# Patient Record
Sex: MALE | Race: WHITE | NOT HISPANIC OR LATINO | Employment: FULL TIME | URBAN - METROPOLITAN AREA
[De-identification: names, ages, dates, MRNs, and addresses within clinical notes are randomized per-mention and may not be internally consistent; named-entity substitution may affect disease eponyms.]

---

## 2020-03-19 ENCOUNTER — OCCUPATIONAL HEALTH (OUTPATIENT)
Dept: URGENT CARE | Facility: CLINIC | Age: 30
End: 2020-03-19

## 2020-03-19 DIAGNOSIS — Z02.89 ENCOUNTER FOR PHYSICAL EXAMINATION RELATED TO EMPLOYMENT: ICD-10-CM

## 2020-03-19 PROCEDURE — 94010 BREATHING CAPACITY TEST: CPT | Mod: S$GLB,,, | Performed by: EMERGENCY MEDICINE

## 2020-03-19 PROCEDURE — 94010 BREATHING CAPACITY TEST: ICD-10-PCS | Mod: S$GLB,,, | Performed by: EMERGENCY MEDICINE

## 2020-03-19 PROCEDURE — 99499 PHYSICAL - BASIC COMPLEXITY: ICD-10-PCS | Mod: S$GLB,,, | Performed by: EMERGENCY MEDICINE

## 2020-03-19 PROCEDURE — 36415 PR COLLECTION VENOUS BLOOD,VENIPUNCTURE: ICD-10-PCS | Mod: S$GLB,,, | Performed by: EMERGENCY MEDICINE

## 2020-03-19 PROCEDURE — 99499 UNLISTED E&M SERVICE: CPT | Mod: S$GLB,,, | Performed by: EMERGENCY MEDICINE

## 2020-03-19 PROCEDURE — 92552 PR PURE TONE AUDIOMETRY, AIR: ICD-10-PCS | Mod: S$GLB,,, | Performed by: EMERGENCY MEDICINE

## 2020-03-19 PROCEDURE — 36415 COLL VENOUS BLD VENIPUNCTURE: CPT | Mod: S$GLB,,, | Performed by: EMERGENCY MEDICINE

## 2020-03-19 PROCEDURE — 99173 PR VISUAL SCREENING TEST, BILAT: ICD-10-PCS | Mod: S$GLB,,, | Performed by: EMERGENCY MEDICINE

## 2020-03-19 PROCEDURE — 92552 PURE TONE AUDIOMETRY AIR: CPT | Mod: S$GLB,,, | Performed by: EMERGENCY MEDICINE

## 2020-03-19 PROCEDURE — 99173 VISUAL ACUITY SCREEN: CPT | Mod: S$GLB,,, | Performed by: EMERGENCY MEDICINE

## 2022-05-09 ENCOUNTER — OCCUPATIONAL HEALTH (OUTPATIENT)
Dept: URGENT CARE | Facility: CLINIC | Age: 32
End: 2022-05-09

## 2022-05-09 DIAGNOSIS — Z13.9 ENCOUNTER FOR SCREENING: Primary | ICD-10-CM

## 2022-05-09 LAB
CTP QC/QA: YES
POC 10 PANEL DRUG SCREEN: NEGATIVE

## 2022-05-09 PROCEDURE — 80305 POCT RAPID DRUG SCREEN 10 PANEL: ICD-10-PCS | Mod: S$GLB,,, | Performed by: NURSE PRACTITIONER

## 2022-05-09 PROCEDURE — 80305 DRUG TEST PRSMV DIR OPT OBS: CPT | Mod: S$GLB,,, | Performed by: NURSE PRACTITIONER

## 2022-07-25 ENCOUNTER — OFFICE VISIT (OUTPATIENT)
Dept: URGENT CARE | Facility: CLINIC | Age: 32
End: 2022-07-25
Payer: COMMERCIAL

## 2022-07-25 VITALS
TEMPERATURE: 99 F | DIASTOLIC BLOOD PRESSURE: 94 MMHG | HEART RATE: 80 BPM | SYSTOLIC BLOOD PRESSURE: 144 MMHG | WEIGHT: 195 LBS | HEIGHT: 70 IN | RESPIRATION RATE: 18 BRPM | OXYGEN SATURATION: 99 % | BODY MASS INDEX: 27.92 KG/M2

## 2022-07-25 DIAGNOSIS — R10.32 ACUTE LEFT LOWER QUADRANT PAIN: Primary | ICD-10-CM

## 2022-07-25 PROCEDURE — 99213 PR OFFICE/OUTPT VISIT, EST, LEVL III, 20-29 MIN: ICD-10-PCS | Mod: S$GLB,,, | Performed by: PHYSICIAN ASSISTANT

## 2022-07-25 PROCEDURE — 3080F DIAST BP >= 90 MM HG: CPT | Mod: CPTII,S$GLB,, | Performed by: PHYSICIAN ASSISTANT

## 2022-07-25 PROCEDURE — 1160F RVW MEDS BY RX/DR IN RCRD: CPT | Mod: CPTII,S$GLB,, | Performed by: PHYSICIAN ASSISTANT

## 2022-07-25 PROCEDURE — 1160F PR REVIEW ALL MEDS BY PRESCRIBER/CLIN PHARMACIST DOCUMENTED: ICD-10-PCS | Mod: CPTII,S$GLB,, | Performed by: PHYSICIAN ASSISTANT

## 2022-07-25 PROCEDURE — 3080F PR MOST RECENT DIASTOLIC BLOOD PRESSURE >= 90 MM HG: ICD-10-PCS | Mod: CPTII,S$GLB,, | Performed by: PHYSICIAN ASSISTANT

## 2022-07-25 PROCEDURE — 1159F PR MEDICATION LIST DOCUMENTED IN MEDICAL RECORD: ICD-10-PCS | Mod: CPTII,S$GLB,, | Performed by: PHYSICIAN ASSISTANT

## 2022-07-25 PROCEDURE — 3077F SYST BP >= 140 MM HG: CPT | Mod: CPTII,S$GLB,, | Performed by: PHYSICIAN ASSISTANT

## 2022-07-25 PROCEDURE — 1159F MED LIST DOCD IN RCRD: CPT | Mod: CPTII,S$GLB,, | Performed by: PHYSICIAN ASSISTANT

## 2022-07-25 PROCEDURE — 3077F PR MOST RECENT SYSTOLIC BLOOD PRESSURE >= 140 MM HG: ICD-10-PCS | Mod: CPTII,S$GLB,, | Performed by: PHYSICIAN ASSISTANT

## 2022-07-25 PROCEDURE — 3008F BODY MASS INDEX DOCD: CPT | Mod: CPTII,S$GLB,, | Performed by: PHYSICIAN ASSISTANT

## 2022-07-25 PROCEDURE — 3008F PR BODY MASS INDEX (BMI) DOCUMENTED: ICD-10-PCS | Mod: CPTII,S$GLB,, | Performed by: PHYSICIAN ASSISTANT

## 2022-07-25 PROCEDURE — 99213 OFFICE O/P EST LOW 20 MIN: CPT | Mod: S$GLB,,, | Performed by: PHYSICIAN ASSISTANT

## 2022-07-25 RX ORDER — METRONIDAZOLE 500 MG/1
500 TABLET ORAL 3 TIMES DAILY
Qty: 21 TABLET | Refills: 0 | Status: SHIPPED | OUTPATIENT
Start: 2022-07-25 | End: 2022-08-01

## 2022-07-25 RX ORDER — CIPROFLOXACIN 500 MG/1
500 TABLET ORAL 2 TIMES DAILY
Qty: 14 TABLET | Refills: 0 | Status: SHIPPED | OUTPATIENT
Start: 2022-07-25 | End: 2022-08-01

## 2022-07-25 NOTE — PROGRESS NOTES
"Subjective:       Patient ID: Cornelius Villatoro is a 32 y.o. male.    Vitals:  height is 5' 10" (1.778 m) and weight is 88.5 kg (195 lb). His temperature is 99 °F (37.2 °C). His blood pressure is 144/94 (abnormal) and his pulse is 80. His respiration is 18 and oxygen saturation is 99%.     Chief Complaint: Abdominal Pain    Pt presents with LLQ abdominal pain x 2 days. Specific to quadrant with no radiation.  He denies any GI upset.  Afebrile.  Denies any hematochezia or melena.  Denies any diarrhea or constipation.  His last bowel movement was this morning.  He reports a history of diverticulitis and states the current symptoms are similar to those experienced with that diagnosis. Taking motrin prn with mild relief.  Denies any URI symptoms.  Denies any chest pain or shortness of breath.  Denies any  symptoms.    Abdominal Pain  This is a new problem. The current episode started yesterday. The onset quality is gradual. The problem occurs constantly. The problem has been unchanged. The pain is located in the LLQ. The pain is at a severity of 6/10. The pain is moderate. The quality of the pain is cramping and aching. The abdominal pain does not radiate. Pertinent negatives include no constipation, diarrhea, fever, headaches, myalgias, nausea or vomiting. Nothing aggravates the pain. Relieved by: heat.       Constitution: Negative for chills and fever.   HENT: Negative for congestion and sore throat.    Cardiovascular: Negative for chest pain.   Respiratory: Negative for cough and shortness of breath.    Gastrointestinal: Positive for abdominal pain (Left lower quadrant). Negative for nausea, vomiting, constipation and diarrhea.   Musculoskeletal: Negative for muscle ache.   Skin: Negative for rash.   Neurological: Negative for headaches.       Objective:      Physical Exam   Constitutional: He is oriented to person, place, and time. He appears well-developed.  Non-toxic appearance. He does not appear ill. No " distress.   HENT:   Head: Normocephalic and atraumatic.   Ears:   Right Ear: External ear normal.   Left Ear: External ear normal.   Nose: Nose normal.   Mouth/Throat: Mucous membranes are normal.   Eyes: Conjunctivae and lids are normal. Right eye exhibits no discharge. Left eye exhibits no discharge. No scleral icterus.   Neck: Trachea normal. Neck supple.   Cardiovascular: Normal rate, regular rhythm and normal heart sounds.   No murmur heard.Exam reveals no gallop and no friction rub.   Pulmonary/Chest: Effort normal and breath sounds normal. No stridor. No respiratory distress. He has no wheezes. He has no rhonchi. He has no rales.   Abdominal: Normal appearance and bowel sounds are normal. He exhibits no distension, no abdominal bruit, no pulsatile midline mass and no mass. Soft. There is abdominal tenderness (Mild LLQ TTP.) in the left lower quadrant. There is no rebound, no guarding, no tenderness at McBurney's point and negative Stephen's sign.   Musculoskeletal: Normal range of motion.         General: Normal range of motion.   Neurological: He is alert and oriented to person, place, and time. He has normal strength.   Skin: Skin is warm, dry, intact, not diaphoretic and not pale.   Psychiatric: His speech is normal and behavior is normal. Judgment and thought content normal.   Nursing note and vitals reviewed.        Assessment:       1. Acute left lower quadrant pain        Plan:     patient does have mild left lower quadrant abdominal pain on exam however no signs of acute abdomen.  Based on his history of diverticulitis will cover at this time with ciprofloxacin and metronidazole.  Drink lots of water and eat a soft diet.  For any worsening abdominal pain or intractable nausea/vomiting, especially if accompanied by a fever, go to the emergency room.  Patient given strict ER precautions.  Patient verbalizes understanding and is amenable treatment plan.    Acute left lower quadrant pain  -      ciprofloxacin HCl (CIPRO) 500 MG tablet; Take 1 tablet (500 mg total) by mouth 2 (two) times daily. for 7 days  Dispense: 14 tablet; Refill: 0  -     metroNIDAZOLE (FLAGYL) 500 MG tablet; Take 1 tablet (500 mg total) by mouth 3 (three) times daily. for 7 days  Dispense: 21 tablet; Refill: 0      Patient Instructions   Please follow up with your Primary care provider within 2-5 days if your signs and symptoms have not resolved or worsen.     If your condition worsens or fails to improve we recommend that you receive another evaluation at the emergency room immediately or contact your primary medical clinic to discuss your concerns.   You must understand that you have received an Urgent Care treatment only and that you may be released before all of your medical problems are known or treated. You, the patient, will arrange for follow up care as instructed.     RED FLAGS/WARNING SYMPTOMS DISCUSSED WITH PATIENT THAT WOULD WARRANT EMERGENT MEDICAL ATTENTION. PATIENT VERBALIZED UNDERSTANDING.

## 2022-10-12 DIAGNOSIS — R25.1 DYSPHONIA OF ORGANIC TREMOR: Primary | ICD-10-CM

## 2022-10-12 DIAGNOSIS — R49.0 DYSPHONIA OF ORGANIC TREMOR: Primary | ICD-10-CM

## 2022-10-12 DIAGNOSIS — R53.1 ASTHENIA: ICD-10-CM

## 2023-04-05 ENCOUNTER — OFFICE VISIT (OUTPATIENT)
Dept: UROLOGY | Facility: CLINIC | Age: 33
End: 2023-04-05
Payer: COMMERCIAL

## 2023-04-05 VITALS — WEIGHT: 181.69 LBS | BODY MASS INDEX: 26.01 KG/M2 | HEIGHT: 70 IN

## 2023-04-05 DIAGNOSIS — R33.9 INCOMPLETE BLADDER EMPTYING: ICD-10-CM

## 2023-04-05 DIAGNOSIS — R39.198 DIFFICULTY URINATING: Primary | ICD-10-CM

## 2023-04-05 LAB
BILIRUBIN, UA POC OHS: NEGATIVE
BLOOD, UA POC OHS: NEGATIVE
CLARITY, UA POC OHS: CLEAR
COLOR, UA POC OHS: YELLOW
GLUCOSE, UA POC OHS: NEGATIVE
KETONES, UA POC OHS: NEGATIVE
LEUKOCYTES, UA POC OHS: NEGATIVE
NITRITE, UA POC OHS: NEGATIVE
PH, UA POC OHS: 6.5
POC RESIDUAL URINE VOLUME: 112 ML (ref 0–100)
PROTEIN, UA POC OHS: NEGATIVE
SPECIFIC GRAVITY, UA POC OHS: <=1.005
UROBILINOGEN, UA POC OHS: 0.2

## 2023-04-05 PROCEDURE — 99204 PR OFFICE/OUTPT VISIT, NEW, LEVL IV, 45-59 MIN: ICD-10-PCS | Mod: S$GLB,,, | Performed by: STUDENT IN AN ORGANIZED HEALTH CARE EDUCATION/TRAINING PROGRAM

## 2023-04-05 PROCEDURE — 1159F PR MEDICATION LIST DOCUMENTED IN MEDICAL RECORD: ICD-10-PCS | Mod: CPTII,S$GLB,, | Performed by: STUDENT IN AN ORGANIZED HEALTH CARE EDUCATION/TRAINING PROGRAM

## 2023-04-05 PROCEDURE — 81003 POCT URINALYSIS(INSTRUMENT): ICD-10-PCS | Mod: QW,S$GLB,, | Performed by: STUDENT IN AN ORGANIZED HEALTH CARE EDUCATION/TRAINING PROGRAM

## 2023-04-05 PROCEDURE — 3008F BODY MASS INDEX DOCD: CPT | Mod: CPTII,S$GLB,, | Performed by: STUDENT IN AN ORGANIZED HEALTH CARE EDUCATION/TRAINING PROGRAM

## 2023-04-05 PROCEDURE — 99999 PR PBB SHADOW E&M-EST. PATIENT-LVL III: CPT | Mod: PBBFAC,,, | Performed by: STUDENT IN AN ORGANIZED HEALTH CARE EDUCATION/TRAINING PROGRAM

## 2023-04-05 PROCEDURE — 3008F PR BODY MASS INDEX (BMI) DOCUMENTED: ICD-10-PCS | Mod: CPTII,S$GLB,, | Performed by: STUDENT IN AN ORGANIZED HEALTH CARE EDUCATION/TRAINING PROGRAM

## 2023-04-05 PROCEDURE — 1160F RVW MEDS BY RX/DR IN RCRD: CPT | Mod: CPTII,S$GLB,, | Performed by: STUDENT IN AN ORGANIZED HEALTH CARE EDUCATION/TRAINING PROGRAM

## 2023-04-05 PROCEDURE — 1160F PR REVIEW ALL MEDS BY PRESCRIBER/CLIN PHARMACIST DOCUMENTED: ICD-10-PCS | Mod: CPTII,S$GLB,, | Performed by: STUDENT IN AN ORGANIZED HEALTH CARE EDUCATION/TRAINING PROGRAM

## 2023-04-05 PROCEDURE — 81003 URINALYSIS AUTO W/O SCOPE: CPT | Mod: QW,S$GLB,, | Performed by: STUDENT IN AN ORGANIZED HEALTH CARE EDUCATION/TRAINING PROGRAM

## 2023-04-05 PROCEDURE — 99204 OFFICE O/P NEW MOD 45 MIN: CPT | Mod: S$GLB,,, | Performed by: STUDENT IN AN ORGANIZED HEALTH CARE EDUCATION/TRAINING PROGRAM

## 2023-04-05 PROCEDURE — 51798 POCT BLADDER SCAN: ICD-10-PCS | Mod: S$GLB,,, | Performed by: STUDENT IN AN ORGANIZED HEALTH CARE EDUCATION/TRAINING PROGRAM

## 2023-04-05 PROCEDURE — 51798 US URINE CAPACITY MEASURE: CPT | Mod: S$GLB,,, | Performed by: STUDENT IN AN ORGANIZED HEALTH CARE EDUCATION/TRAINING PROGRAM

## 2023-04-05 PROCEDURE — 99999 PR PBB SHADOW E&M-EST. PATIENT-LVL III: ICD-10-PCS | Mod: PBBFAC,,, | Performed by: STUDENT IN AN ORGANIZED HEALTH CARE EDUCATION/TRAINING PROGRAM

## 2023-04-05 PROCEDURE — 1159F MED LIST DOCD IN RCRD: CPT | Mod: CPTII,S$GLB,, | Performed by: STUDENT IN AN ORGANIZED HEALTH CARE EDUCATION/TRAINING PROGRAM

## 2023-04-05 RX ORDER — LABETALOL 100 MG/1
100 TABLET, FILM COATED ORAL 2 TIMES DAILY
COMMUNITY

## 2023-04-05 RX ORDER — MELOXICAM 15 MG/1
15 TABLET ORAL DAILY
Qty: 30 TABLET | Refills: 0 | Status: SHIPPED | OUTPATIENT
Start: 2023-04-05 | End: 2023-05-02

## 2023-04-05 NOTE — PROGRESS NOTES
"[facility] - [facility]   Clinic Note    Subjective:     Chief Complaint: Other (Difficulty urinating (straining ))      History of Present Illness:  Cornelius Villatoro is a 32 y.o. male who presents to clinic for evaluation and management of LUTS. He is new to our clinic.    He reports a history of diverticulitis and reports having the onset of lower pelvic pain that felt different than that typical episode. He reports a weaker stream over the last week. He denies hematuria and dysuria. No pneumaturia or fecaluria. He has no history of urinary tract infections. PVR was measured and noted to be 112 mL.   IPSS Questionnaire (AUA-SS):  1) Incomplete Emptying 2 - Less than half the time   2) Frequency 3 - About half the time   3) Intermittency 2 - Less than half the time   4) Urgency 3 - About half the time   5) Weak Stream  2 - Less than half the time   6) Straining 3 - About half the time   7) Nocturia 1 - 1 time   Total score:   16   Quality of Life:  5 - Unhappy     Past medical, family, surgical and social history reviewed as documented in chart with pertinent positive medical, family, surgical and social history detailed in HPI.    A review systems was conducted with pertinent positive and negative findings documented in HPI.    Anticoagulation/Antiplatelets:  No    Objective:     Estimated body mass index is 26.07 kg/m² as calculated from the following:    Height as of this encounter: 5' 10" (1.778 m).    Weight as of this encounter: 82.4 kg (181 lb 10.5 oz).    Vital Signs (Most Recent)       Physical Exam  Vitals and nursing note reviewed.   Constitutional:       General: He is not in acute distress.     Appearance: He is not ill-appearing or toxic-appearing.   Pulmonary:      Effort: Pulmonary effort is normal. No accessory muscle usage or respiratory distress.   Neurological:      Mental Status: He is alert.       Lab Results   Component Value Date    BUN 10 04/01/2023    CREATININE 0.99 04/01/2023    WBC " 9.01 04/01/2023    HGB 14.9 04/01/2023    HCT 43.5 04/01/2023     04/01/2023    AST 27 04/01/2023    ALT 28 04/01/2023    ALKPHOS 65 04/01/2023    ALBUMIN 4.6 04/01/2023        No results found for: PSA, PSADIAG, PSAFREE, PSAFREEPCT, PSARAT     Urine dipstick today was negative for all components.     Assessment:     1. Difficulty urinating    2. Incomplete bladder emptying      Plan:     His postvoid residual is relatively elevated.  His postvoid residual is relatively elevated.  We discussed possible etiologies including urethral stricture disease or acute inflammation of the prostate.  We discussed diverticulitis and possibility of colovesical fistula however this is very unlikely given his overall symptoms.  Recommend a trial of anti-inflammatories - mobic was prescribed.   Discussed Uroxatral as a trial but this is contraindicated given his labetalol.    If symptoms fail to resolve in the next few weeks I recommend cystoscopy to rule out any anatomic abnormalities.    Barry Humphries MD

## 2023-05-02 DIAGNOSIS — R39.198 DIFFICULTY URINATING: ICD-10-CM

## 2023-05-02 RX ORDER — MELOXICAM 15 MG/1
TABLET ORAL
Qty: 30 TABLET | Refills: 0 | Status: SHIPPED | OUTPATIENT
Start: 2023-05-02 | End: 2023-06-19

## 2023-06-19 ENCOUNTER — OFFICE VISIT (OUTPATIENT)
Dept: NEUROLOGY | Facility: CLINIC | Age: 33
End: 2023-06-19
Payer: COMMERCIAL

## 2023-06-19 ENCOUNTER — PATIENT MESSAGE (OUTPATIENT)
Dept: NEUROLOGY | Facility: CLINIC | Age: 33
End: 2023-06-19
Payer: COMMERCIAL

## 2023-06-19 DIAGNOSIS — R53.1 WEAKNESS: Primary | ICD-10-CM

## 2023-06-19 DIAGNOSIS — R53.83 FATIGUE, UNSPECIFIED TYPE: ICD-10-CM

## 2023-06-19 DIAGNOSIS — R25.1 SHAKING: ICD-10-CM

## 2023-06-19 DIAGNOSIS — R53.1 WEAKNESS GENERALIZED: Primary | ICD-10-CM

## 2023-06-19 PROCEDURE — 1159F MED LIST DOCD IN RCRD: CPT | Mod: CPTII,95,, | Performed by: NURSE PRACTITIONER

## 2023-06-19 PROCEDURE — 1160F RVW MEDS BY RX/DR IN RCRD: CPT | Mod: CPTII,95,, | Performed by: NURSE PRACTITIONER

## 2023-06-19 PROCEDURE — 1160F PR REVIEW ALL MEDS BY PRESCRIBER/CLIN PHARMACIST DOCUMENTED: ICD-10-PCS | Mod: CPTII,95,, | Performed by: NURSE PRACTITIONER

## 2023-06-19 PROCEDURE — 99205 PR OFFICE/OUTPT VISIT, NEW, LEVL V, 60-74 MIN: ICD-10-PCS | Mod: 95,,, | Performed by: NURSE PRACTITIONER

## 2023-06-19 PROCEDURE — 99205 OFFICE O/P NEW HI 60 MIN: CPT | Mod: 95,,, | Performed by: NURSE PRACTITIONER

## 2023-06-19 PROCEDURE — 99417 PROLNG OP E/M EACH 15 MIN: CPT | Mod: 95,,, | Performed by: NURSE PRACTITIONER

## 2023-06-19 PROCEDURE — 99417 PR PROLONGED SVC, OUTPT, W/WO DIRECT PT CONTACT,  EA ADDTL 15 MIN: ICD-10-PCS | Mod: 95,,, | Performed by: NURSE PRACTITIONER

## 2023-06-19 PROCEDURE — 1159F PR MEDICATION LIST DOCUMENTED IN MEDICAL RECORD: ICD-10-PCS | Mod: CPTII,95,, | Performed by: NURSE PRACTITIONER

## 2023-06-19 NOTE — PROGRESS NOTES
6-19-23    The patient location is: [place], [place]  The chief complaint leading to consultation is: shaking and weakness    Visit type: audiovisual    Face to Face time with patient: 95 minutes of total time spent on the encounter, which includes face to face time and non-face to face time preparing to see the patient (eg, review of tests), Obtaining and/or reviewing separately obtained history, Documenting clinical information in the electronic or other health record, Independently interpreting results (not separately reported) and communicating results to the patient/family/caregiver, or Care coordination (not separately reported).         Each patient to whom he or she provides medical services by telemedicine is:  (1) informed of the relationship between the physician and patient and the respective role of any other health care provider with respect to management of the patient; and (2) notified that he or she may decline to receive medical services by telemedicine and may withdraw from such care at any time.    Notes: 31 yo RH male with hx of diverticulitis presents virtually for the evaluation of shaking and weakness.  He is alone today.     Chart review reveals visit to urology 4-5-23 for difficulty urinating and did have elevated post void residual per Dr. Humphries's note. He was given meloxicam. (He did find it helpful and completed meloxicam therapy).     He is currently taking labetolol for high blood pressure and anxiety. Not sure if it helps. Also takes cholestyrine once every couple of days PRN. He has taken this since cholecystectomy several years ago.     Sometime around July or August last year, he had another episode of diverticulitis. Treated with antibiotics. Did not require surgery. While he was home recovering, he started feeling shaky and weak but presumed it was because he was feeling bad. After he recovered from diverticulitis, the shakiness and weakness never went away. Saw GI who did not feel  this was due to diverticulitis. Blood work did not real anything.     Talked to PCP (outside [facility]). He thought his symptoms were anxiety and high blood pressure. He did have a life event 5 years ago that caused depression and anxiety. He has not had issues with this for years. He does not feel anxious.     Still a year later, he feels shaky and weak at times. He feels the shaky and weakness always happen together. He does not feel weak without feeling shaky and vice versa. This does not occur daily. He will note that his calves and arms feel fatigued, like he just worked out. Feels weak. May last couple of days and goes away.    When he is shaky, it is equally all over his body. Even when he lays down, he can feel his body moving. His girlfriend cannot see or feel it when it occurs. He does not think anyone else has ever visibly seen his body shake.       Weakness is also a full body feeling. Feels worse in the legs but not sure if it is because he works on his feet. He works in manual labor. He still feels he has good power. He can lift everything he needs to lift.     He can have days where he is asymptomatic or at least not feeling as much.   He may have 1 or 2 days a week where he is totally asymptomatic.   He has maybe 1 or 2 days per week where he feels it is worse than his normal feeling is shaking and weak.     He either awakes with the symptoms or does not. He knows how the day is going to be upon awaking. He does not have days where he is feeling  good and then starts to feel bad. He can pretty much predict how the day is going to go based on how he feels upon waking.  Symptoms never cause him to miss work.     He has not been able to determine if anything he does would provoke symptoms the next day. He feels it is very random.    Had a period of time a couple of mos ago where he had pain in stomach and it was really difficult for him to urinate. He had to strain. Saw urologist. He gave him  meloxicam, which helped. Told if medicine worked, he did not have to come back.      He thinks he can have shaking in certain positions but did not actually appreciate this until exam today. He feels this is more with hands that he notices a positional relationship. If holds his phone, he may note he is a little more shaky.     There is no one in [facility] with shaking, tremor, muscle issues or anything similar to what he describes.      Balance is good. No falls.   No pain.   No SOA  No CP. No pedal edema.   No vision issues- no blurred or dilplopia  No issues swallowoing or drooling  Bouts of occasional consitpation and diarreha  : no current issues  Neuro:no HA, no seizure or stroke. Denies numbness, tingling or burning.   Psych: no issues sleeping, +depression diagnosed 5 years ago- not an issue currently, denies anxiety       Does not take  any vitamins or supplements.   No triggering or relieving factors.     LIMITED EXAM:  Awake, alert, pleasant and cooperative.   RR equal and unlabored. NAD.   Face symmetric.   EOMI. No ptosis.   Hearing intact to conversation.   Tongue protrudes to midline.   Shoulder shrug equal.   Speech spontaneous and fluent. No  hypophonia or dysarthria.    No pronator drift.   No rest tremor.   Slight intention tremor LH only.   No postural or kinetic tremor.   No bradykinesia.  Seated.       IMPRESSION:  Episodes of generalized weakness    -without loss of power/strength by his account   -always happens with sensation of body shaking   -started about a year ago while recovering from diverticulitis  Episodes of whole body shaking   -internal sensation    -occurs only with episodes of generalized weakness   -started about a year ago while recovering from diverticulitis  Intention tremor   -noted briefly in LH during exam today   -has noted some shaking in hands in certain positions  Urine straining and retention   -recently treated by urologist with meloxicam, helped  Diverticulitis     -several bouts of in past, last time was around July 2022      PLAN:  I have rev'd recent labs. I have ordered the following: magnesium, B1, B12, B6, MMA, MG panel, heavy metals, sed rate, magnesium and MRI brain with and without.     If above work up is negative, I will likely have him see neuromuscular on NS.   He understands this would need to be an in person exam as limited on what we can accomplish virtually. He lives in [place].      Other than the slight intention tremor, do not appreciate movement disorder.     He verbalizes understanding of the plan.     Follow up TBD.     ..  ..Johanna Burnett, ABBEY, NP-C    ADDENDUM 6-20-23:   Messaged thru portal:  Kelly Gillette Johanna, you told me to message you if I had thought of anything else and I did.  I had a pretty freak accident to my right Achilles about 3 years ago, where I had a good cut across my right Achilles,  it cut through the sheath but did not cut the tendon.  I did some therapy, but its never been the same.  As all my shaking issues have continued, I have noticed some shaking/twitching right where that cut was.  Sorry I dont know how I forgot to bring that up, but figured it could be relevant. Thank you

## 2023-06-20 ENCOUNTER — PATIENT MESSAGE (OUTPATIENT)
Dept: NEUROLOGY | Facility: CLINIC | Age: 33
End: 2023-06-20
Payer: COMMERCIAL

## 2023-06-23 ENCOUNTER — PATIENT MESSAGE (OUTPATIENT)
Dept: NEUROLOGY | Facility: CLINIC | Age: 33
End: 2023-06-23
Payer: COMMERCIAL

## 2023-07-07 ENCOUNTER — HOSPITAL ENCOUNTER (OUTPATIENT)
Dept: RADIOLOGY | Facility: HOSPITAL | Age: 33
Discharge: HOME | End: 2023-07-07
Attending: NURSE PRACTITIONER
Payer: COMMERCIAL

## 2023-07-07 DIAGNOSIS — R53.1 WEAKNESS GENERALIZED: ICD-10-CM

## 2023-07-07 DIAGNOSIS — R25.1 SHAKING: ICD-10-CM

## 2023-07-07 PROCEDURE — A9585 GADOBUTROL INJECTION: HCPCS | Mod: PO | Performed by: NURSE PRACTITIONER

## 2023-07-07 PROCEDURE — 25500020 PHARM REV CODE 255: Mod: PO | Performed by: NURSE PRACTITIONER

## 2023-07-07 PROCEDURE — 70553 MRI BRAIN W WO CONTRAST: ICD-10-PCS | Mod: 26,,, | Performed by: RADIOLOGY

## 2023-07-07 PROCEDURE — 70553 MRI BRAIN STEM W/O & W/DYE: CPT | Mod: TC,PO

## 2023-07-07 PROCEDURE — 70553 MRI BRAIN STEM W/O & W/DYE: CPT | Mod: 26,,, | Performed by: RADIOLOGY

## 2023-07-07 RX ORDER — GADOBUTROL 604.72 MG/ML
8 INJECTION INTRAVENOUS
Status: COMPLETED | OUTPATIENT
Start: 2023-07-07 | End: 2023-07-07

## 2023-07-07 RX ADMIN — GADOBUTROL 8 ML: 604.72 INJECTION INTRAVENOUS at 01:07

## 2023-08-04 ENCOUNTER — PATIENT MESSAGE (OUTPATIENT)
Dept: NEUROLOGY | Facility: CLINIC | Age: 33
End: 2023-08-04
Payer: COMMERCIAL

## 2023-08-07 NOTE — TELEPHONE ENCOUNTER
8-7-23- Spoke to patient.   Reviewed labs- normal.  Rev'd brain scan report. Had vascular team view due to the hemosiderin. Looks cw HTN.   Discussed vascular RF and importance of RF control.   He does not exercise routinely. Diet varies.   Enc exercise and Mediterranean diet.  Enc strict control of BP.     At this point, hold on additional referrals. He says that he has started feeling better with less episodes since our visit.     Should things change, he will let me know.  Would consider sending to resident clinic.   Follow up PRN at this time.

## 2023-08-18 ENCOUNTER — TELEPHONE (OUTPATIENT)
Dept: NEUROLOGY | Facility: CLINIC | Age: 33
End: 2023-08-18
Payer: COMMERCIAL

## 2023-08-18 NOTE — TELEPHONE ENCOUNTER
----- Message from Johanna Burnett NP sent at 6/19/2023  5:20 PM CDT -----  Please help him schedule MRI brain w and w/o at [facility].    sudden onset